# Patient Record
Sex: MALE | Race: BLACK OR AFRICAN AMERICAN | Employment: UNEMPLOYED | URBAN - METROPOLITAN AREA
[De-identification: names, ages, dates, MRNs, and addresses within clinical notes are randomized per-mention and may not be internally consistent; named-entity substitution may affect disease eponyms.]

---

## 2023-12-02 ENCOUNTER — APPOINTMENT (OUTPATIENT)
Dept: GENERAL RADIOLOGY | Age: 43
End: 2023-12-02

## 2023-12-02 ENCOUNTER — HOSPITAL ENCOUNTER (EMERGENCY)
Age: 43
Discharge: HOME | End: 2023-12-02
Attending: EMERGENCY MEDICINE

## 2023-12-02 VITALS
DIASTOLIC BLOOD PRESSURE: 114 MMHG | HEIGHT: 72 IN | HEART RATE: 97 BPM | SYSTOLIC BLOOD PRESSURE: 180 MMHG | WEIGHT: 233.03 LBS | TEMPERATURE: 98.7 F | RESPIRATION RATE: 18 BRPM | OXYGEN SATURATION: 95 % | BODY MASS INDEX: 31.56 KG/M2

## 2023-12-02 DIAGNOSIS — G89.29 ACUTE EXACERBATION OF CHRONIC LOW BACK PAIN: Primary | ICD-10-CM

## 2023-12-02 DIAGNOSIS — M54.50 ACUTE EXACERBATION OF CHRONIC LOW BACK PAIN: Primary | ICD-10-CM

## 2023-12-02 PROCEDURE — 6360000002 HC RX W HCPCS: Performed by: EMERGENCY MEDICINE

## 2023-12-02 PROCEDURE — 96372 THER/PROPH/DIAG INJ SC/IM: CPT

## 2023-12-02 PROCEDURE — 6370000000 HC RX 637 (ALT 250 FOR IP): Performed by: EMERGENCY MEDICINE

## 2023-12-02 PROCEDURE — 72100 X-RAY EXAM L-S SPINE 2/3 VWS: CPT

## 2023-12-02 PROCEDURE — 99284 EMERGENCY DEPT VISIT MOD MDM: CPT

## 2023-12-02 RX ORDER — NAPROXEN 375 MG/1
375 TABLET ORAL EVERY 12 HOURS PRN
Qty: 60 TABLET | Refills: 0 | Status: SHIPPED | OUTPATIENT
Start: 2023-12-02

## 2023-12-02 RX ORDER — KETOROLAC TROMETHAMINE 30 MG/ML
30 INJECTION, SOLUTION INTRAMUSCULAR; INTRAVENOUS ONCE
Status: COMPLETED | OUTPATIENT
Start: 2023-12-02 | End: 2023-12-02

## 2023-12-02 RX ORDER — ORPHENADRINE CITRATE 30 MG/ML
60 INJECTION INTRAMUSCULAR; INTRAVENOUS ONCE
Status: COMPLETED | OUTPATIENT
Start: 2023-12-02 | End: 2023-12-02

## 2023-12-02 RX ORDER — HYDROCODONE BITARTRATE AND ACETAMINOPHEN 5; 325 MG/1; MG/1
1 TABLET ORAL ONCE
Status: COMPLETED | OUTPATIENT
Start: 2023-12-02 | End: 2023-12-02

## 2023-12-02 RX ORDER — CYCLOBENZAPRINE HCL 10 MG
10 TABLET ORAL 3 TIMES DAILY PRN
Qty: 21 TABLET | Refills: 0 | Status: SHIPPED | OUTPATIENT
Start: 2023-12-02 | End: 2023-12-12

## 2023-12-02 RX ORDER — LIDOCAINE 50 MG/G
1 PATCH TOPICAL DAILY
Qty: 10 PATCH | Refills: 0 | Status: SHIPPED | OUTPATIENT
Start: 2023-12-02 | End: 2023-12-12

## 2023-12-02 RX ADMIN — HYDROCODONE BITARTRATE AND ACETAMINOPHEN 1 TABLET: 5; 325 TABLET ORAL at 21:12

## 2023-12-02 RX ADMIN — KETOROLAC TROMETHAMINE 30 MG: 30 INJECTION, SOLUTION INTRAMUSCULAR at 21:12

## 2023-12-02 RX ADMIN — ORPHENADRINE CITRATE 60 MG: 60 INJECTION INTRAMUSCULAR; INTRAVENOUS at 21:12

## 2023-12-02 ASSESSMENT — PAIN SCALES - GENERAL
PAINLEVEL_OUTOF10: 9
PAINLEVEL_OUTOF10: 8

## 2023-12-02 ASSESSMENT — PAIN DESCRIPTION - LOCATION: LOCATION: BACK

## 2023-12-02 ASSESSMENT — PAIN - FUNCTIONAL ASSESSMENT: PAIN_FUNCTIONAL_ASSESSMENT: 0-10

## 2023-12-02 ASSESSMENT — PAIN DESCRIPTION - DESCRIPTORS: DESCRIPTORS: SHARP

## 2023-12-02 ASSESSMENT — PAIN DESCRIPTION - PAIN TYPE: TYPE: ACUTE PAIN

## 2023-12-02 ASSESSMENT — PAIN DESCRIPTION - ORIENTATION: ORIENTATION: LOWER

## 2023-12-03 NOTE — ED PROVIDER NOTES
given the following medications:   Medications   HYDROcodone-acetaminophen (NORCO) 5-325 MG per tablet 1 tablet (1 tablet Oral Given 12/2/23 2112)   ketorolac (TORADOL) injection 30 mg (30 mg IntraMUSCular Given 12/2/23 2112)   orphenadrine (NORFLEX) injection 60 mg (60 mg IntraMUSCular Given 12/2/23 2112)          CC/HPI Summary, DDx, ED Course, and Reassessment:   Muscle strain, muscle spasm, degenerative disc disease, herniated disc, contusion, ligamentous injury, other    Patient as above. Seen and evaluated. Nontoxic and afebrile. Presents for low back pain that is acute on chronic. Has had lumbar strains in the past.  Patient had no falls or trauma today. Bent over and then his back tightened up on him. He has normal neurologic exam and normal distal perfusion. No acute findings on high-sensitivity neuroexam to suggest cauda equina, epidural abscess, epidural hematoma or cord compression. Positive straight leg raise bilaterally. Signs and symptoms suggestive of muscle spasm and strain. Will obtain x-ray of the lumbar spine and provide a dose of oral Norco, IM Toradol and IM Norflex for symptomatic control. Patient agreeable care plan    ED Course as of 12/03/23 0110   Sat Dec 02, 2023   2237 X-ray of the lumbar spine reviewed by myself with no acute malalignment or traumatic injuries. Patient and significant other bedside updated on results. Discussed discharge with outpatient follow-up with symptomatic control with naproxen, Flexeril, Lidoderm patches. Patient agreeable. Return instructions provided. All questions answered prior to discharge [DS]      ED Course User Index  [DS] Maci Hughes MD       I estimate there is LOW risk for ABDOMINAL AORTIC ANEURYSM, CAUDA EQUINA SYNDROME, EPIDURAL MASS LESION, SPINAL STENOSIS, OR HERNIATED DISK CAUSING SEVERE STENOSIS, thus I consider the discharge disposition reasonable.  Anay Thomas and I have discussed the diagnosis and risks, and we agree

## 2023-12-03 NOTE — DISCHARGE INSTRUCTIONS
Call today or tomorrow to follow up with primary care physician referral from ED in 5-7 days. Use an ice pack or bag filled with ice and apply to the injured area 3 - 4 times a day for 15 - 20 minutes each time. If the injury is older than 3 days, then use a heating pad to help relax the muscles in your back. You were discharged home with naproxen which is an anti-inflammatory medication similar to ibuprofen. Do not take ibuprofen while taking naproxen. You are also discharged with Flexeril which is a muscle relaxer which can make you tired. Do not drink alcohol or drive a vehicle while taking naproxen    Malick' Flexion Exercises     1. Pelvic tilt. Lie on your back with knees bent, feet flat on floor. Flatten the small of your back against the floor, without pushing down with the legs. Hold for 5 to 10 seconds. 2. Single Knee to chest. Lie on your back with knees bent and feet flat on the floor. Slowly pull your right knee toward your shoulder and hold 5 to 10 seconds. Lower the knee and repeat with the other knee. 3. Double knee to chest. Begin as in the previous exercise. After pulling right knee to chest, pull left knee to chest and hold both knees for 5 to 10 seconds. Slowly lower one leg at a time. 4. Partial sit-up. Do the pelvic tilt (exercise 1) and, while holding this position, slowly curl your head and shoulders off the floor. Hold briefly. Return slowly to the starting position. 5. Hamstring stretch. Start in long sitting with toes directed toward the ceiling and knees fully extended. Slowly lower the trunk forward over the legs, keeping knees extended, arms outstretched over the legs, and eyes focus ahead. 6. Hip Flexor stretch. Place one foot in front of the other with the left (front) knee flexed and the right (back) knee held rigidly straight. Flex forward through the trunk until the left knee contacts the axillary fold (arm pit region).  Repeat with right leg forward

## 2023-12-03 NOTE — ED TRIAGE NOTES
Patient to ED for lower back pain. Patient reports pain after bending over, no trauma, no popping sensation when bending over. Patient is alert and oriented with GCS 15. Patient describes pain as sharp and rates an 8. Patient denies any other complaints at this time.

## 2023-12-03 NOTE — ED NOTES
Discharge and education instructions reviewed. Patient verbalized understanding, teach-back successful. Patient denied questions at this time. No acute distress noted. Patient instructed to follow-up as noted - return to emergency department if symptoms worsen. Patient verbalized understanding. Discharged per EDMD with discharge instructions.        Valentina Torres RN  12/02/23 [id number]

## 2024-11-04 ENCOUNTER — HOSPITAL ENCOUNTER (EMERGENCY)
Age: 44
Discharge: HOME | End: 2024-11-05
Attending: STUDENT IN AN ORGANIZED HEALTH CARE EDUCATION/TRAINING PROGRAM
Payer: COMMERCIAL

## 2024-11-04 VITALS
HEIGHT: 72 IN | OXYGEN SATURATION: 95 % | SYSTOLIC BLOOD PRESSURE: 156 MMHG | WEIGHT: 239.2 LBS | HEART RATE: 88 BPM | BODY MASS INDEX: 32.4 KG/M2 | RESPIRATION RATE: 15 BRPM | DIASTOLIC BLOOD PRESSURE: 97 MMHG | TEMPERATURE: 98.5 F

## 2024-11-04 DIAGNOSIS — M54.9 MUSCULOSKELETAL BACK PAIN: Primary | ICD-10-CM

## 2024-11-04 LAB
ALBUMIN SERPL-MCNC: 4.7 G/DL (ref 3.4–5)
ALBUMIN/GLOB SERPL: 2 {RATIO} (ref 1.1–2.2)
ALP SERPL-CCNC: 85 U/L (ref 40–129)
ALT SERPL-CCNC: 29 U/L (ref 10–40)
ANION GAP SERPL CALCULATED.3IONS-SCNC: 12 MMOL/L (ref 3–16)
AST SERPL-CCNC: 23 U/L (ref 15–37)
BASOPHILS # BLD: 0 K/UL (ref 0–0.2)
BASOPHILS NFR BLD: 0.6 %
BILIRUB SERPL-MCNC: 0.9 MG/DL (ref 0–1)
BILIRUB UR QL STRIP.AUTO: NEGATIVE
BUN SERPL-MCNC: 15 MG/DL (ref 7–20)
CALCIUM SERPL-MCNC: 9.2 MG/DL (ref 8.3–10.6)
CHLORIDE SERPL-SCNC: 100 MMOL/L (ref 99–110)
CLARITY UR: CLEAR
CO2 SERPL-SCNC: 27 MMOL/L (ref 21–32)
COLOR UR: YELLOW
CREAT SERPL-MCNC: 1.1 MG/DL (ref 0.9–1.3)
DEPRECATED RDW RBC AUTO: 13.3 % (ref 12.4–15.4)
EOSINOPHIL # BLD: 0.1 K/UL (ref 0–0.6)
EOSINOPHIL NFR BLD: 0.7 %
FLUAV RNA UPPER RESP QL NAA+PROBE: NEGATIVE
FLUBV AG NPH QL: NEGATIVE
GFR SERPLBLD CREATININE-BSD FMLA CKD-EPI: 86 ML/MIN/{1.73_M2}
GLUCOSE SERPL-MCNC: 115 MG/DL (ref 70–99)
GLUCOSE UR STRIP.AUTO-MCNC: NEGATIVE MG/DL
HCT VFR BLD AUTO: 45.4 % (ref 40.5–52.5)
HGB BLD-MCNC: 15.5 G/DL (ref 13.5–17.5)
HGB UR QL STRIP.AUTO: NEGATIVE
KETONES UR STRIP.AUTO-MCNC: ABNORMAL MG/DL
LEUKOCYTE ESTERASE UR QL STRIP.AUTO: NEGATIVE
LYMPHOCYTES # BLD: 1.5 K/UL (ref 1–5.1)
LYMPHOCYTES NFR BLD: 18.1 %
MCH RBC QN AUTO: 28.7 PG (ref 26–34)
MCHC RBC AUTO-ENTMCNC: 34.2 G/DL (ref 31–36)
MCV RBC AUTO: 83.8 FL (ref 80–100)
MONOCYTES # BLD: 0.6 K/UL (ref 0–1.3)
MONOCYTES NFR BLD: 7.3 %
NEUTROPHILS # BLD: 5.9 K/UL (ref 1.7–7.7)
NEUTROPHILS NFR BLD: 73.3 %
NITRITE UR QL STRIP.AUTO: NEGATIVE
PH UR STRIP.AUTO: 6 [PH] (ref 5–8)
PLATELET # BLD AUTO: 246 K/UL (ref 135–450)
PMV BLD AUTO: 8.3 FL (ref 5–10.5)
POTASSIUM SERPL-SCNC: 3.5 MMOL/L (ref 3.5–5.1)
PROT SERPL-MCNC: 7.1 G/DL (ref 6.4–8.2)
PROT UR STRIP.AUTO-MCNC: NEGATIVE MG/DL
RBC # BLD AUTO: 5.42 M/UL (ref 4.2–5.9)
SARS-COV-2 RDRP RESP QL NAA+PROBE: NOT DETECTED
SODIUM SERPL-SCNC: 139 MMOL/L (ref 136–145)
SP GR UR STRIP.AUTO: 1.02 (ref 1–1.03)
UA COMPLETE W REFLEX CULTURE PNL UR: ABNORMAL
UA DIPSTICK W REFLEX MICRO PNL UR: ABNORMAL
URN SPEC COLLECT METH UR: ABNORMAL
UROBILINOGEN UR STRIP-ACNC: 2 E.U./DL
WBC # BLD AUTO: 8.1 K/UL (ref 4–11)

## 2024-11-04 PROCEDURE — 96372 THER/PROPH/DIAG INJ SC/IM: CPT

## 2024-11-04 PROCEDURE — 85025 COMPLETE CBC W/AUTO DIFF WBC: CPT

## 2024-11-04 PROCEDURE — 87804 INFLUENZA ASSAY W/OPTIC: CPT

## 2024-11-04 PROCEDURE — 99284 EMERGENCY DEPT VISIT MOD MDM: CPT

## 2024-11-04 PROCEDURE — 87502 INFLUENZA DNA AMP PROBE: CPT

## 2024-11-04 PROCEDURE — 6360000002 HC RX W HCPCS: Performed by: STUDENT IN AN ORGANIZED HEALTH CARE EDUCATION/TRAINING PROGRAM

## 2024-11-04 PROCEDURE — 6370000000 HC RX 637 (ALT 250 FOR IP): Performed by: STUDENT IN AN ORGANIZED HEALTH CARE EDUCATION/TRAINING PROGRAM

## 2024-11-04 PROCEDURE — 80053 COMPREHEN METABOLIC PANEL: CPT

## 2024-11-04 PROCEDURE — 36415 COLL VENOUS BLD VENIPUNCTURE: CPT

## 2024-11-04 PROCEDURE — 87635 SARS-COV-2 COVID-19 AMP PRB: CPT

## 2024-11-04 PROCEDURE — 81003 URINALYSIS AUTO W/O SCOPE: CPT

## 2024-11-04 RX ORDER — KETOROLAC TROMETHAMINE 30 MG/ML
30 INJECTION, SOLUTION INTRAMUSCULAR; INTRAVENOUS ONCE
Status: COMPLETED | OUTPATIENT
Start: 2024-11-04 | End: 2024-11-04

## 2024-11-04 RX ORDER — LIDOCAINE 50 MG/G
1 PATCH TOPICAL DAILY
Qty: 10 PATCH | Refills: 0 | Status: SHIPPED | OUTPATIENT
Start: 2024-11-04 | End: 2024-11-14

## 2024-11-04 RX ORDER — ACETAMINOPHEN 500 MG
1000 TABLET ORAL ONCE
Status: COMPLETED | OUTPATIENT
Start: 2024-11-04 | End: 2024-11-04

## 2024-11-04 RX ORDER — LIDOCAINE 4 G/G
1 PATCH TOPICAL ONCE
Status: DISCONTINUED | OUTPATIENT
Start: 2024-11-04 | End: 2024-11-05 | Stop reason: HOSPADM

## 2024-11-04 RX ADMIN — ACETAMINOPHEN 1000 MG: 500 TABLET ORAL at 23:19

## 2024-11-04 RX ADMIN — KETOROLAC TROMETHAMINE 30 MG: 30 INJECTION, SOLUTION INTRAMUSCULAR at 23:18

## 2024-11-04 ASSESSMENT — PAIN DESCRIPTION - LOCATION
LOCATION: FLANK

## 2024-11-04 ASSESSMENT — PAIN DESCRIPTION - DESCRIPTORS
DESCRIPTORS: DISCOMFORT

## 2024-11-04 ASSESSMENT — PAIN - FUNCTIONAL ASSESSMENT
PAIN_FUNCTIONAL_ASSESSMENT: 0-10
PAIN_FUNCTIONAL_ASSESSMENT: ACTIVITIES ARE NOT PREVENTED

## 2024-11-04 ASSESSMENT — PAIN DESCRIPTION - PAIN TYPE
TYPE: ACUTE PAIN
TYPE: ACUTE PAIN

## 2024-11-04 ASSESSMENT — PAIN SCALES - GENERAL
PAINLEVEL_OUTOF10: 4
PAINLEVEL_OUTOF10: 5
PAINLEVEL_OUTOF10: 3

## 2024-11-04 ASSESSMENT — PAIN DESCRIPTION - ORIENTATION
ORIENTATION: RIGHT

## 2024-11-05 ASSESSMENT — PAIN DESCRIPTION - LOCATION: LOCATION: FLANK

## 2024-11-05 ASSESSMENT — PAIN DESCRIPTION - DESCRIPTORS: DESCRIPTORS: DISCOMFORT

## 2024-11-05 ASSESSMENT — PAIN - FUNCTIONAL ASSESSMENT
PAIN_FUNCTIONAL_ASSESSMENT: ACTIVITIES ARE NOT PREVENTED
PAIN_FUNCTIONAL_ASSESSMENT: 0-10

## 2024-11-05 ASSESSMENT — PAIN DESCRIPTION - ORIENTATION: ORIENTATION: RIGHT

## 2024-11-05 ASSESSMENT — PAIN DESCRIPTION - PAIN TYPE: TYPE: ACUTE PAIN

## 2024-11-05 ASSESSMENT — PAIN SCALES - GENERAL: PAINLEVEL_OUTOF10: 3

## 2024-11-05 NOTE — DISCHARGE INSTRUCTIONS
Please return to the ER for any worsening symptoms or concerns.  Please follow-up with the PCP that we have provided to you within the next week.

## 2024-11-05 NOTE — ED NOTES
Introduce myself to the patient, identification band inplace, stretcher in the lowest position for safety, and the call light is in reach. Updated patient on Emergency Department plan of care, no additional needs at this time, will continue with care.    Pt given a urine specimen cup for a sample.

## 2024-11-05 NOTE — ED PROVIDER NOTES
[facility]    EMERGENCY DEPARTMENT ENCOUNTER        Patient Name: Dilip Balderas  MRN: [id number]  Birthdate 1980  Date of evaluation: 11/4/2024  PCP: No primary care provider on file.  Note Started: 11:09 PM EST 11/4/24    CHIEF COMPLAINT       Flank Pain (Pt presents to the ED d/t right side flank pain x 2 days. +n/v/d  Denies any fevers or dysuria. Pt has been around a few family members that have been sick; one has pneumonia. )      HISTORY OF PRESENT ILLNESS: 1 or more Elements       Dilip Balderas is a 44 y.o. male  who presents to the ED for evaluation of right flank pain x 2 days.  He states that the pain is exacerbated upon turning from the waist towards his left side.  He works at a warehouse and often lifts heavy objects.  He denies any urinary symptoms or blood in his urine.  No history of kidney stones in the family or personal history.  No fever, chills, chest pain, shortness of breath, vomiting, abdominal pain, numbness, tingling urinary symptoms or any other complaints.    No other complaints, modifying factors or associated symptoms.     History obtained by the patient unless stated otherwise as above on HPI.   No limitations unless specified as above on HPI.     Past medical history:   Past Medical History:   Diagnosis Date    Hypertension        Past surgical history: History reviewed. No pertinent surgical history.    Home medications:   Prior to Admission medications    Medication Sig Start Date End Date Taking? Authorizing Provider   lidocaine (LIDODERM) 5 % Place 1 patch onto the skin daily for 10 days 12 hours on, 12 hours off. 11/4/24 11/14/24 Yes Ivett Rivera MD   naproxen (NAPROSYN) 500 MG tablet Take 1 tablet by mouth 2 times daily (with meals) 2/13/23   ProviderCarmela MD   penicillin v potassium (VEETID) 500 MG tablet Take 1 tablet by mouth 4 times daily 2/13/23   Carmela Moralez MD   methocarbamol (ROBAXIN) 500 MG tablet Take 1

## 2024-12-19 ENCOUNTER — APPOINTMENT (OUTPATIENT)
Dept: GENERAL RADIOLOGY | Age: 44
End: 2024-12-19
Payer: COMMERCIAL

## 2024-12-19 ENCOUNTER — HOSPITAL ENCOUNTER (EMERGENCY)
Age: 44
Discharge: HOME | End: 2024-12-19
Payer: COMMERCIAL

## 2024-12-19 VITALS
RESPIRATION RATE: 15 BRPM | DIASTOLIC BLOOD PRESSURE: 98 MMHG | OXYGEN SATURATION: 94 % | TEMPERATURE: 98.4 F | HEART RATE: 63 BPM | SYSTOLIC BLOOD PRESSURE: 151 MMHG | BODY MASS INDEX: 32.01 KG/M2 | WEIGHT: 236 LBS

## 2024-12-19 DIAGNOSIS — I10 HYPERTENSION, UNSPECIFIED TYPE: Primary | ICD-10-CM

## 2024-12-19 LAB
ALBUMIN SERPL-MCNC: 4.5 G/DL (ref 3.4–5)
ALBUMIN/GLOB SERPL: 1.5 {RATIO} (ref 1.1–2.2)
ALP SERPL-CCNC: 82 U/L (ref 40–129)
ALT SERPL-CCNC: 33 U/L (ref 10–40)
ANION GAP SERPL CALCULATED.3IONS-SCNC: 13 MMOL/L (ref 3–16)
AST SERPL-CCNC: 30 U/L (ref 15–37)
BASOPHILS # BLD: 0.1 K/UL (ref 0–0.2)
BASOPHILS NFR BLD: 0.8 %
BILIRUB SERPL-MCNC: 0.4 MG/DL (ref 0–1)
BUN SERPL-MCNC: 16 MG/DL (ref 7–20)
CALCIUM SERPL-MCNC: 9.6 MG/DL (ref 8.3–10.6)
CHLORIDE SERPL-SCNC: 100 MMOL/L (ref 99–110)
CO2 SERPL-SCNC: 25 MMOL/L (ref 21–32)
CREAT SERPL-MCNC: 1.2 MG/DL (ref 0.9–1.3)
DEPRECATED RDW RBC AUTO: 13 % (ref 12.4–15.4)
EKG ATRIAL RATE: 88 BPM
EKG DIAGNOSIS: NORMAL
EKG P AXIS: 62 DEGREES
EKG P-R INTERVAL: 124 MS
EKG Q-T INTERVAL: 350 MS
EKG QRS DURATION: 80 MS
EKG QTC CALCULATION (BAZETT): 423 MS
EKG R AXIS: 61 DEGREES
EKG T AXIS: 30 DEGREES
EKG VENTRICULAR RATE: 88 BPM
EOSINOPHIL # BLD: 0.1 K/UL (ref 0–0.6)
EOSINOPHIL NFR BLD: 2 %
GFR SERPLBLD CREATININE-BSD FMLA CKD-EPI: 77 ML/MIN/{1.73_M2}
GLUCOSE SERPL-MCNC: 100 MG/DL (ref 70–99)
HCT VFR BLD AUTO: 46.7 % (ref 40.5–52.5)
HGB BLD-MCNC: 15.8 G/DL (ref 13.5–17.5)
LYMPHOCYTES # BLD: 1.7 K/UL (ref 1–5.1)
LYMPHOCYTES NFR BLD: 23.8 %
MCH RBC QN AUTO: 28.6 PG (ref 26–34)
MCHC RBC AUTO-ENTMCNC: 33.9 G/DL (ref 31–36)
MCV RBC AUTO: 84.3 FL (ref 80–100)
MONOCYTES # BLD: 0.7 K/UL (ref 0–1.3)
MONOCYTES NFR BLD: 10.5 %
NEUTROPHILS # BLD: 4.5 K/UL (ref 1.7–7.7)
NEUTROPHILS NFR BLD: 62.9 %
PLATELET # BLD AUTO: 272 K/UL (ref 135–450)
PMV BLD AUTO: 8.1 FL (ref 5–10.5)
POTASSIUM SERPL-SCNC: 4.1 MMOL/L (ref 3.5–5.1)
PROT SERPL-MCNC: 7.5 G/DL (ref 6.4–8.2)
RBC # BLD AUTO: 5.54 M/UL (ref 4.2–5.9)
SODIUM SERPL-SCNC: 138 MMOL/L (ref 136–145)
TROPONIN, HIGH SENSITIVITY: 7 NG/L (ref 0–22)
WBC # BLD AUTO: 7.1 K/UL (ref 4–11)

## 2024-12-19 PROCEDURE — 99285 EMERGENCY DEPT VISIT HI MDM: CPT

## 2024-12-19 PROCEDURE — 80053 COMPREHEN METABOLIC PANEL: CPT

## 2024-12-19 PROCEDURE — 85025 COMPLETE CBC W/AUTO DIFF WBC: CPT

## 2024-12-19 PROCEDURE — 93010 ELECTROCARDIOGRAM REPORT: CPT | Performed by: INTERNAL MEDICINE

## 2024-12-19 PROCEDURE — 36415 COLL VENOUS BLD VENIPUNCTURE: CPT

## 2024-12-19 PROCEDURE — 93005 ELECTROCARDIOGRAM TRACING: CPT | Performed by: NURSE PRACTITIONER

## 2024-12-19 PROCEDURE — 84484 ASSAY OF TROPONIN QUANT: CPT

## 2024-12-19 PROCEDURE — 71045 X-RAY EXAM CHEST 1 VIEW: CPT

## 2024-12-19 ASSESSMENT — PAIN - FUNCTIONAL ASSESSMENT: PAIN_FUNCTIONAL_ASSESSMENT: NONE - DENIES PAIN

## 2024-12-19 NOTE — ED NOTES
Discharge instructions reviewed with Pt. Pt verbalizes understanding at this time. VS as noted. Pt condition stable at this time. No concerns voiced.

## 2024-12-22 NOTE — ED PROVIDER NOTES
[facility]  ED  EMERGENCY DEPARTMENT ENCOUNTER        Pt Name: Dilip Balderas  MRN: [id number]  Birthdate 1980  Date of evaluation: 12/19/2024  Provider: PAO Boone CNP  PCP: No primary care provider on file.  Note Started: 11:57 AM EST 12/22/24      SANDRA. I have evaluated this patient.        CHIEF COMPLAINT       Chief Complaint   Patient presents with    Hypertension     Pt arrived via EMS. Pt reports has Hx of HTN, had changes to BP medication over the past week. Pt reports starting @ 10am pt reports having episodes of dizziness and numbness in his left lower extremity. Pt denies any additional symptoms at triage.     Numbness       HISTORY OF PRESENT ILLNESS: 1 or more Elements     History From: the patient  Limitations to history : None    Dilip Balderas is a 44 y.o. male who presents to the ER today with complaints of elevated blood pressure.  Patient states that doctors been changing blood pressure medications, states that he had some intermittent dizziness but denies any symptoms currently states that he feels fine.  States his blood pressures at home were over 200.  Here for further evaluation.  Denies any chest pain or difficulty breathing.    Nursing Notes were all reviewed and agreed with or any disagreements were addressed in the HPI.    REVIEW OF SYSTEMS :      Review of Systems    Positives and Pertinent negatives as per HPI.     SURGICAL HISTORY   History reviewed. No pertinent surgical history.    CURRENTMEDICATIONS       Discharge Medication List as of 12/19/2024  2:21 PM        CONTINUE these medications which have NOT CHANGED    Details   !! naproxen (NAPROSYN) 500 MG tablet Take 1 tablet by mouth 2 times daily (with meals)Historical Med      penicillin v potassium (VEETID) 500 MG tablet Take 1 tablet by mouth 4 times dailyHistorical Med      methocarbamol (ROBAXIN) 500 MG tablet Take 1 tablet by mouth 4 times dailyHistorical Med      ibuprofen

## 2025-01-17 ENCOUNTER — HOSPITAL ENCOUNTER (EMERGENCY)
Age: 45
Discharge: HOME | End: 2025-01-17
Attending: EMERGENCY MEDICINE
Payer: COMMERCIAL

## 2025-01-17 VITALS
TEMPERATURE: 98.1 F | OXYGEN SATURATION: 97 % | SYSTOLIC BLOOD PRESSURE: 172 MMHG | DIASTOLIC BLOOD PRESSURE: 98 MMHG | HEIGHT: 72 IN | WEIGHT: 237.66 LBS | HEART RATE: 90 BPM | BODY MASS INDEX: 32.19 KG/M2 | RESPIRATION RATE: 14 BRPM

## 2025-01-17 DIAGNOSIS — I10 ESSENTIAL HYPERTENSION: Primary | ICD-10-CM

## 2025-01-17 DIAGNOSIS — Z76.0 ENCOUNTER FOR MEDICATION REFILL: ICD-10-CM

## 2025-01-17 PROCEDURE — 99283 EMERGENCY DEPT VISIT LOW MDM: CPT

## 2025-01-17 RX ORDER — AMLODIPINE BESYLATE 10 MG/1
10 TABLET ORAL DAILY
Qty: 30 TABLET | Refills: 0 | Status: SHIPPED | OUTPATIENT
Start: 2025-01-17

## 2025-01-17 ASSESSMENT — PAIN - FUNCTIONAL ASSESSMENT: PAIN_FUNCTIONAL_ASSESSMENT: NONE - DENIES PAIN

## 2025-01-17 ASSESSMENT — PAIN SCALES - GENERAL: PAINLEVEL_OUTOF10: 0

## 2025-01-17 NOTE — ED PROVIDER NOTES
eMERGENCY dEPARTMENT eNCOUnter      Pt Name: Dilip Balderas  MRN: [id number]  Birthdate 1980  Date of evaluation: 1/17/2025  Provider: JAIME BATRES MD     CHIEF COMPLAINT       Chief Complaint   Patient presents with    Medication Refill     Pt is on amlodipine 10mg and needs a refill. Pt sees PCP on 1/24. Pt has been out for a few days.          HISTORY OF PRESENT ILLNESS   (Location/Symptom, Timing/Onset,Context/Setting, Quality, Duration, Modifying Factors, Severity) Note limiting factors.   HPI    Dilip Balderas is a 44 y.o. male who presents to the emergency department with history of essential hypertension on Norvasc 10 mg once a day is currently out.  Blood pressure is high.  Has appointment next week with a family doctor.  Patient is requesting a med refill.  Patient denies any symptoms no headache no blurry vision.  No nausea no vomiting.  Patient's blood pressure is elevated.  Recently was placed on this 30 days ago.  There has been no chest pain.    Nursing Notes were reviewed.    REVIEW OFSYSTEMS    (2+ for level 4; 10+ for level 5)   Review of Systems    General: No fevers, chills or night sweats, No weight loss    Head:  No Sore throat,  No Ear Pain    Chest:  Nontender.  No Cough, No SOB,  Chest Pain    GI: No abdominal pain or vomiting    : No dysuria or hematuria    Musculoskeletal: No unrelenting pain or night pain    Neurologic: No bowel or bladder incontinence, No saddle anesthesia, No leg weakness    All other systems reviewed and are negative.        PAST MEDICAL HISTORY     Past Medical History:   Diagnosis Date    Hypertension        SURGICAL HISTORY     History reviewed. No pertinent surgical history.    CURRENT MEDICATIONS       Previous Medications    CHLORHEXIDINE (HIBICLENS) 4 % EXTERNAL LIQUID    Soak hand 2 X a day in bucket containing 1/2 Hibiclens and 1/2 water.    DOXYCYCLINE HYCLATE (VIBRAMYCIN) 100 MG CAPSULE    Take 1 capsule by mouth 2 times daily